# Patient Record
Sex: MALE | Race: WHITE | NOT HISPANIC OR LATINO | Employment: FULL TIME | URBAN - METROPOLITAN AREA
[De-identification: names, ages, dates, MRNs, and addresses within clinical notes are randomized per-mention and may not be internally consistent; named-entity substitution may affect disease eponyms.]

---

## 2020-10-13 PROCEDURE — U0003 INFECTIOUS AGENT DETECTION BY NUCLEIC ACID (DNA OR RNA); SEVERE ACUTE RESPIRATORY SYNDROME CORONAVIRUS 2 (SARS-COV-2) (CORONAVIRUS DISEASE [COVID-19]), AMPLIFIED PROBE TECHNIQUE, MAKING USE OF HIGH THROUGHPUT TECHNOLOGIES AS DESCRIBED BY CMS-2020-01-R: HCPCS | Performed by: EMERGENCY MEDICINE

## 2020-10-23 ENCOUNTER — OFFICE VISIT (OUTPATIENT)
Dept: FAMILY MEDICINE CLINIC | Facility: CLINIC | Age: 50
End: 2020-10-23

## 2020-10-23 ENCOUNTER — RESULTS ENCOUNTER (OUTPATIENT)
Dept: FAMILY MEDICINE CLINIC | Facility: CLINIC | Age: 50
End: 2020-10-23

## 2020-10-23 VITALS
SYSTOLIC BLOOD PRESSURE: 148 MMHG | TEMPERATURE: 97.1 F | WEIGHT: 265.8 LBS | DIASTOLIC BLOOD PRESSURE: 92 MMHG | RESPIRATION RATE: 16 BRPM | HEART RATE: 79 BPM | OXYGEN SATURATION: 98 % | HEIGHT: 71 IN | BODY MASS INDEX: 37.21 KG/M2

## 2020-10-23 DIAGNOSIS — E66.09 CLASS 2 OBESITY DUE TO EXCESS CALORIES WITHOUT SERIOUS COMORBIDITY WITH BODY MASS INDEX (BMI) OF 37.0 TO 37.9 IN ADULT: ICD-10-CM

## 2020-10-23 DIAGNOSIS — Z12.11 SCREENING FOR COLON CANCER: ICD-10-CM

## 2020-10-23 DIAGNOSIS — K21.9 GASTROESOPHAGEAL REFLUX DISEASE WITHOUT ESOPHAGITIS: ICD-10-CM

## 2020-10-23 DIAGNOSIS — I10 ESSENTIAL HYPERTENSION: Primary | ICD-10-CM

## 2020-10-23 DIAGNOSIS — J30.1 SEASONAL ALLERGIC RHINITIS DUE TO POLLEN: ICD-10-CM

## 2020-10-23 DIAGNOSIS — Z00.00 ANNUAL PHYSICAL EXAM: ICD-10-CM

## 2020-10-23 PROBLEM — E66.812 CLASS 2 OBESITY DUE TO EXCESS CALORIES WITHOUT SERIOUS COMORBIDITY WITH BODY MASS INDEX (BMI) OF 37.0 TO 37.9 IN ADULT: Status: ACTIVE | Noted: 2020-10-23

## 2020-10-23 PROCEDURE — 80053 COMPREHEN METABOLIC PANEL: CPT | Performed by: FAMILY MEDICINE

## 2020-10-23 PROCEDURE — 80061 LIPID PANEL: CPT | Performed by: FAMILY MEDICINE

## 2020-10-23 PROCEDURE — 93000 ELECTROCARDIOGRAM COMPLETE: CPT | Performed by: FAMILY MEDICINE

## 2020-10-23 PROCEDURE — 36415 COLL VENOUS BLD VENIPUNCTURE: CPT | Performed by: FAMILY MEDICINE

## 2020-10-23 PROCEDURE — 99386 PREV VISIT NEW AGE 40-64: CPT | Performed by: FAMILY MEDICINE

## 2020-10-23 RX ORDER — RIBOFLAVIN (VITAMIN B2) 100 MG
TABLET ORAL
COMMUNITY
Start: 2019-10-14

## 2020-10-23 RX ORDER — LISINOPRIL 20 MG/1
20 TABLET ORAL DAILY
Qty: 30 TABLET | Refills: 0 | Status: SHIPPED | OUTPATIENT
Start: 2020-10-23 | End: 2020-11-30 | Stop reason: SDUPTHER

## 2020-10-23 RX ORDER — FLUTICASONE PROPIONATE 50 MCG
2 SPRAY, SUSPENSION (ML) NASAL DAILY
Qty: 15.8 ML | Refills: 5 | Status: SHIPPED | OUTPATIENT
Start: 2020-10-23

## 2020-10-23 NOTE — PROGRESS NOTES
New Patient Office Visit      Patient Name: Saroj Ruggiero  : 1970   MRN: [id number]     Chief Complaint:    Chief Complaint   Patient presents with   • Establish Care   • Annual Exam   • Hypertension       History of Present Illness: Saroj Ruggiero is a 50 y.o. male who is here today to establish care.  Patient presents today with hypertension that is uncontrolled.  Patient was started on 10 mg at a urgent care center in reports has been taking 10 mg twice daily.  Patient denies any chest pain or shortness of breath.  Patient denies any side effects.  Patient has obesity is uncontrolled.  Patient has chronic GERD this controlled on Nexium.  Patient is also here for annual exam today.    Patient annual exam he says he is up-to-date on vaccines because he works at [employer].  Patient is okay with getting a Cologuard done today.  Otherwise patient is okay with doing some screening labs like lipids and CMP today.  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  Patient is okay with change in his diet as well.  Patient used to smoke cigarettes does not smoke any longer.    Subjective      Review of Systems:   Review of Systems   Constitutional: Negative for unexpected weight gain and unexpected weight loss.   HENT: Positive for congestion and postnasal drip. Negative for trouble swallowing.    Eyes: Negative for visual disturbance.   Respiratory: Negative for shortness of breath.    Cardiovascular: Negative for leg swelling.   Gastrointestinal: Negative for abdominal pain.   Endocrine: Negative for polyuria.   Genitourinary: Negative for difficulty urinating.   Musculoskeletal: Positive for joint swelling. Negative for arthralgias.   Skin: Negative for color change.   Allergic/Immunologic: Negative for immunocompromised state.   Neurological: Negative for weakness.   Hematological: Does not bruise/bleed easily.    Psychiatric/Behavioral: Negative for agitation.       Past Medical History: History reviewed. No pertinent past medical history.    Past Surgical History:   Past Surgical History:   Procedure Laterality Date   • CHOLECYSTECTOMY         Family History:   Family History   Problem Relation Age of Onset   • Diabetes Mother    • Hypertension Mother    • Heart attack Father    • Hypertension Father    • Anxiety disorder Father    • Heart valve disorder Sister    • No Known Problems Daughter    • No Known Problems Son    • Hypertension Maternal Grandmother    • Hypertension Maternal Grandfather    • Heart attack Maternal Grandfather    • No Known Problems Paternal Grandmother    • Heart attack Paternal Grandfather    • No Known Problems Daughter    • No Known Problems Daughter    • No Known Problems Daughter    • No Known Problems Daughter    • No Known Problems Daughter    • No Known Problems Daughter        Social History:   Social History     Socioeconomic History   • Marital status:      Spouse name: Not on file   • Number of children: Not on file   • Years of education: Not on file   • Highest education level: Not on file   Tobacco Use   • Smoking status: Former Smoker     Quit date: 2016     Years since quittin.8   • Smokeless tobacco: Never Used   Substance and Sexual Activity   • Alcohol use: Yes     Comment: socially   • Drug use: Defer   • Sexual activity: Defer       Medications:     Current Outpatient Medications:   •  ascorbic acid (VITAMIN C) 100 MG tablet, , Disp: , Rfl:   •  esomeprazole (nexIUM 24HR) 20 MG capsule, , Disp: , Rfl:   •  Multiple Vitamins-Minerals (MULTIVITAMIN ADULT PO), multivitamin, Disp: , Rfl:   •  fluticasone (Flonase) 50 MCG/ACT nasal spray, Instill 2 sprays into the nostril(s) as directed by provider Daily., Disp: 15.8 mL, Rfl: 5  •  lisinopril (PRINIVIL,ZESTRIL) 20 MG tablet, Take 1 tablet by mouth Daily., Disp: 30 tablet, Rfl: 0    Allergies:   No Known  "Allergies    Objective     Physical Exam:  Vital Signs:   Vitals:    10/23/20 1545   BP: 148/92   BP Location: Left arm   Patient Position: Sitting   Cuff Size: Adult   Pulse: 79   Resp: 16   Temp: 97.1 °F (36.2 °C)   TempSrc: Temporal   SpO2: 98%   Weight: 121 kg (265 lb 12.8 oz)   Height: 180.3 cm (71\")   PainSc: 0-No pain     Body mass index is 37.07 kg/m².     Physical Exam  Vitals signs and nursing note reviewed.   Constitutional:       General: He is not in acute distress.     Appearance: He is well-developed.   HENT:      Head: Normocephalic and atraumatic.      Right Ear: External ear normal.      Left Ear: External ear normal.   Eyes:      General:         Right eye: No discharge.         Left eye: No discharge.      Conjunctiva/sclera: Conjunctivae normal.      Pupils: Pupils are equal, round, and reactive to light.   Neck:      Musculoskeletal: Normal range of motion and neck supple.   Cardiovascular:      Rate and Rhythm: Normal rate and regular rhythm.      Heart sounds: Normal heart sounds. No murmur.   Pulmonary:      Effort: Pulmonary effort is normal. No respiratory distress.      Breath sounds: Normal breath sounds. No wheezing.   Abdominal:      General: Bowel sounds are normal. There is no distension.      Palpations: Abdomen is soft.   Musculoskeletal: Normal range of motion.         General: No deformity.   Skin:     General: Skin is warm and dry.   Neurological:      Mental Status: He is alert and oriented to person, place, and time.      Cranial Nerves: No cranial nerve deficit.   Psychiatric:         Behavior: Behavior normal.         Thought Content: Thought content normal.         Judgment: Judgment normal.         Assessment / Plan      Assessment/Plan:   Diagnoses and all orders for this visit:    1. Essential hypertension (Primary)  -     Comprehensive Metabolic Panel  -     Lipid Panel  -     lisinopril (PRINIVIL,ZESTRIL) 20 MG tablet; Take 1 tablet by mouth Daily.  Dispense: 30 " tablet; Refill: 0    2. Class 2 obesity due to excess calories without serious comorbidity with body mass index (BMI) of 37.0 to 37.9 in adult    3. Gastroesophageal reflux disease without esophagitis    4. Seasonal allergic rhinitis due to pollen  -     fluticasone (Flonase) 50 MCG/ACT nasal spray; Instill 2 sprays into the nostril(s) as directed by provider Daily.  Dispense: 15.8 mL; Refill: 5    5. Annual physical exam  -     Comprehensive Metabolic Panel  -     Lipid Panel    6. Screening for colon cancer  -     Cancel: Cologuard - Stool, Per Rectum  -     Cologuard - Stool, Per Rectum; Future    Other orders  -     ECG 12 Lead         1. Patient was counseled for his annual exam.  Counseling included recommendation of DASH diet which have included in his discharge papers today.  Also discussed importance of 30 minutes of physical activity daily.  Also discussed with patient the importance of yearly vaccines which she is up-to-date on.  Counseled patient on importance of colonoscopy which she is agreed to get the Cologuard today.  Patient was also counseled on importance of yearly visits and medication compliance.  Extensively counseled patient on importance of compliance of medication, diet control, weight loss, checking his blood pressure regularly.  EKG was performed today as he has hypertension and we need 1 on file.  EKG was essentially normal besides bradycardia.      ECG 12 Lead    Date/Time: 10/23/2020 4:39 PM  Performed by: Eric Milian DO  Authorized by: Eric Milian DO   Comparison: not compared with previous ECG   Previous ECG: no previous ECG available  Rhythm: sinus bradycardia  Rate: bradycardic  Conduction: conduction normal  QRS axis: normal  Other: no other findings    Clinical impression: abnormal EKG  Comments: Patient had a EKG that showed sinus bradycardia and QTC was normal at 394.  Patient asymptomatic.  Abnormal EKG              Follow Up:   Return in about 4 weeks (around  11/20/2020) for bp check.    Eric Milian,   [facility] [place]       Please note that portions of this note may have been completed with a voice recognition program. Efforts were made to edit the dictations, but occasionally words are mistranscribed.

## 2020-10-23 NOTE — PATIENT INSTRUCTIONS
"DASH Eating Plan  DASH stands for \"Dietary Approaches to Stop Hypertension.\" The DASH eating plan is a healthy eating plan that has been shown to reduce high blood pressure (hypertension). It may also reduce your risk for type 2 diabetes, heart disease, and stroke. The DASH eating plan may also help with weight loss.  What are tips for following this plan?    General guidelines  · Avoid eating more than 2,300 mg (milligrams) of salt (sodium) a day. If you have hypertension, you may need to reduce your sodium intake to 1,500 mg a day.  · Limit alcohol intake to no more than 1 drink a day for nonpregnant women and 2 drinks a day for men. One drink equals 12 oz of beer, 5 oz of wine, or 1½ oz of hard liquor.  · Work with your health care provider to maintain a healthy body weight or to lose weight. Ask what an ideal weight is for you.  · Get at least 30 minutes of exercise that causes your heart to beat faster (aerobic exercise) most days of the week. Activities may include walking, swimming, or biking.  · Work with your health care provider or diet and nutrition specialist (dietitian) to adjust your eating plan to your individual calorie needs.  Reading food labels    · Check food labels for the amount of sodium per serving. Choose foods with less than 5 percent of the Daily Value of sodium. Generally, foods with less than 300 mg of sodium per serving fit into this eating plan.  · To find whole grains, look for the word \"whole\" as the first word in the ingredient list.  Shopping  · Buy products labeled as \"low-sodium\" or \"no salt added.\"  · Buy fresh foods. Avoid canned foods and premade or frozen meals.  Cooking  · Avoid adding salt when cooking. Use salt-free seasonings or herbs instead of table salt or sea salt. Check with your health care provider or pharmacist before using salt substitutes.  · Do not cuevas foods. Cook foods using healthy methods such as baking, boiling, grilling, and broiling instead.  · Cook with " heart-healthy oils, such as olive, canola, soybean, or sunflower oil.  Meal planning  · Eat a balanced diet that includes:  ? 5 or more servings of fruits and vegetables each day. At each meal, try to fill half of your plate with fruits and vegetables.  ? Up to 6-8 servings of whole grains each day.  ? Less than 6 oz of lean meat, poultry, or fish each day. A 3-oz serving of meat is about the same size as a deck of cards. One egg equals 1 oz.  ? 2 servings of low-fat dairy each day.  ? A serving of nuts, seeds, or beans 5 times each week.  ? Heart-healthy fats. Healthy fats called Omega-3 fatty acids are found in foods such as flaxseeds and coldwater fish, like sardines, salmon, and mackerel.  · Limit how much you eat of the following:  ? Canned or prepackaged foods.  ? Food that is high in trans fat, such as fried foods.  ? Food that is high in saturated fat, such as fatty meat.  ? Sweets, desserts, sugary drinks, and other foods with added sugar.  ? Full-fat dairy products.  · Do not salt foods before eating.  · Try to eat at least 2 vegetarian meals each week.  · Eat more home-cooked food and less restaurant, buffet, and fast food.  · When eating at a restaurant, ask that your food be prepared with less salt or no salt, if possible.  What foods are recommended?  The items listed may not be a complete list. Talk with your dietitian about what dietary choices are best for you.  Grains  Whole-grain or whole-wheat bread. Whole-grain or whole-wheat pasta. Brown rice. Oatmeal. Quinoa. Bulgur. Whole-grain and low-sodium cereals. Andria bread. Low-fat, low-sodium crackers. Whole-wheat flour tortillas.  Vegetables  Fresh or frozen vegetables (raw, steamed, roasted, or grilled). Low-sodium or reduced-sodium tomato and vegetable juice. Low-sodium or reduced-sodium tomato sauce and tomato paste. Low-sodium or reduced-sodium canned vegetables.  Fruits  All fresh, dried, or frozen fruit. Canned fruit in natural juice (without  added sugar).  Meat and other protein foods  Skinless chicken or turkey. Ground chicken or turkey. Pork with fat trimmed off. Fish and seafood. Egg whites. Dried beans, peas, or lentils. Unsalted nuts, nut butters, and seeds. Unsalted canned beans. Lean cuts of beef with fat trimmed off. Low-sodium, lean deli meat.  Dairy  Low-fat (1%) or fat-free (skim) milk. Fat-free, low-fat, or reduced-fat cheeses. Nonfat, low-sodium ricotta or cottage cheese. Low-fat or nonfat yogurt. Low-fat, low-sodium cheese.  Fats and oils  Soft margarine without trans fats. Vegetable oil. Low-fat, reduced-fat, or light mayonnaise and salad dressings (reduced-sodium). Canola, safflower, olive, soybean, and sunflower oils. Avocado.  Seasoning and other foods  Herbs. Spices. Seasoning mixes without salt. Unsalted popcorn and pretzels. Fat-free sweets.  What foods are not recommended?  The items listed may not be a complete list. Talk with your dietitian about what dietary choices are best for you.  Grains  Baked goods made with fat, such as croissants, muffins, or some breads. Dry pasta or rice meal packs.  Vegetables  Creamed or fried vegetables. Vegetables in a cheese sauce. Regular canned vegetables (not low-sodium or reduced-sodium). Regular canned tomato sauce and paste (not low-sodium or reduced-sodium). Regular tomato and vegetable juice (not low-sodium or reduced-sodium). Pickles. Olives.  Fruits  Canned fruit in a light or heavy syrup. Fried fruit. Fruit in cream or butter sauce.  Meat and other protein foods  Fatty cuts of meat. Ribs. Fried meat. Ferrell. Sausage. Bologna and other processed lunch meats. Salami. Fatback. Hotdogs. Bratwurst. Salted nuts and seeds. Canned beans with added salt. Canned or smoked fish. Whole eggs or egg yolks. Chicken or turkey with skin.  Dairy  Whole or 2% milk, cream, and half-and-half. Whole or full-fat cream cheese. Whole-fat or sweetened yogurt. Full-fat cheese. Nondairy creamers. Whipped toppings.  Processed cheese and cheese spreads.  Fats and oils  Butter. Stick margarine. Lard. Shortening. Ghee. Ferrell fat. Tropical oils, such as coconut, palm kernel, or palm oil.  Seasoning and other foods  Salted popcorn and pretzels. Onion salt, garlic salt, seasoned salt, table salt, and sea salt. Worcestershire sauce. Tartar sauce. Barbecue sauce. Teriyaki sauce. Soy sauce, including reduced-sodium. Steak sauce. Canned and packaged gravies. Fish sauce. Oyster sauce. Cocktail sauce. Horseradish that you find on the shelf. Ketchup. Mustard. Meat flavorings and tenderizers. Bouillon cubes. Hot sauce and Tabasco sauce. Premade or packaged marinades. Premade or packaged taco seasonings. Relishes. Regular salad dressings.  Where to find more information:  · National Heart, Lung, and Blood [place]: www.nhlbi.nih.gov  · American Heart Association: www.heart.org  Summary  · The DASH eating plan is a healthy eating plan that has been shown to reduce high blood pressure (hypertension). It may also reduce your risk for type 2 diabetes, heart disease, and stroke.  · With the DASH eating plan, you should limit salt (sodium) intake to 2,300 mg a day. If you have hypertension, you may need to reduce your sodium intake to 1,500 mg a day.  · When on the DASH eating plan, aim to eat more fresh fruits and vegetables, whole grains, lean proteins, low-fat dairy, and heart-healthy fats.  · Work with your health care provider or diet and nutrition specialist (dietitian) to adjust your eating plan to your individual calorie needs.  This information is not intended to replace advice given to you by your health care provider. Make sure you discuss any questions you have with your health care provider.  Document Released: 12/06/2012 Document Revised: 11/30/2018 Document Reviewed: 12/11/2017  Elsevier Patient Education © 2020 [employer] Inc.

## 2020-10-24 LAB
ALBUMIN SERPL-MCNC: 4.8 G/DL (ref 3.5–5.2)
ALBUMIN/GLOB SERPL: 2.1 G/DL
ALP SERPL-CCNC: 144 U/L (ref 39–117)
ALT SERPL W P-5'-P-CCNC: 47 U/L (ref 1–41)
ANION GAP SERPL CALCULATED.3IONS-SCNC: 11.2 MMOL/L (ref 5–15)
AST SERPL-CCNC: 34 U/L (ref 1–40)
BILIRUB SERPL-MCNC: 0.3 MG/DL (ref 0–1.2)
BUN SERPL-MCNC: 13 MG/DL (ref 6–20)
BUN/CREAT SERPL: 13.8 (ref 7–25)
CALCIUM SPEC-SCNC: 9.9 MG/DL (ref 8.6–10.5)
CHLORIDE SERPL-SCNC: 101 MMOL/L (ref 98–107)
CHOLEST SERPL-MCNC: 183 MG/DL (ref 0–200)
CO2 SERPL-SCNC: 29.8 MMOL/L (ref 22–29)
CREAT SERPL-MCNC: 0.94 MG/DL (ref 0.76–1.27)
GFR SERPL CREATININE-BSD FRML MDRD: 84 ML/MIN/1.73
GLOBULIN UR ELPH-MCNC: 2.3 GM/DL
GLUCOSE SERPL-MCNC: 92 MG/DL (ref 65–99)
HDLC SERPL-MCNC: 27 MG/DL (ref 40–60)
LDLC SERPL CALC-MCNC: 103 MG/DL (ref 0–100)
LDLC/HDLC SERPL: 3.47 {RATIO}
POTASSIUM SERPL-SCNC: 4.5 MMOL/L (ref 3.5–5.2)
PROT SERPL-MCNC: 7.1 G/DL (ref 6–8.5)
SODIUM SERPL-SCNC: 142 MMOL/L (ref 136–145)
TRIGL SERPL-MCNC: 311 MG/DL (ref 0–150)
VLDLC SERPL-MCNC: 53 MG/DL (ref 5–40)

## 2020-11-13 ENCOUNTER — TELEPHONE (OUTPATIENT)
Dept: FAMILY MEDICINE CLINIC | Facility: CLINIC | Age: 50
End: 2020-11-13

## 2020-11-13 DIAGNOSIS — R19.5 POSITIVE COLORECTAL CANCER SCREENING USING COLOGUARD TEST: Primary | ICD-10-CM

## 2020-11-13 NOTE — TELEPHONE ENCOUNTER
ALONA WANTED TO LET DR ROTHMAN KNOW THAT THEY HAVE SENT THE RESULTS OF THE PT'S COLOGUARD.     ALONA'S CONTACT [phone]

## 2020-11-25 DIAGNOSIS — Z12.11 SCREENING FOR COLON CANCER: ICD-10-CM

## 2020-11-25 DIAGNOSIS — Z12.11 SCREENING FOR COLON CANCER: Primary | ICD-10-CM

## 2020-11-25 RX ORDER — SODIUM, POTASSIUM,MAG SULFATES 17.5-3.13G
1 SOLUTION, RECONSTITUTED, ORAL ORAL TAKE AS DIRECTED
Qty: 2 BOTTLE | Refills: 0 | Status: SHIPPED | OUTPATIENT
Start: 2020-11-25 | End: 2020-11-25 | Stop reason: SDUPTHER

## 2020-11-30 ENCOUNTER — APPOINTMENT (OUTPATIENT)
Dept: PREADMISSION TESTING | Facility: HOSPITAL | Age: 50
End: 2020-11-30

## 2020-11-30 DIAGNOSIS — I10 ESSENTIAL HYPERTENSION: ICD-10-CM

## 2020-11-30 LAB — SARS-COV-2 RNA RESP QL NAA+PROBE: NOT DETECTED

## 2020-11-30 PROCEDURE — C9803 HOPD COVID-19 SPEC COLLECT: HCPCS

## 2020-11-30 PROCEDURE — U0004 COV-19 TEST NON-CDC HGH THRU: HCPCS

## 2020-11-30 RX ORDER — LISINOPRIL 20 MG/1
20 TABLET ORAL DAILY
Qty: 30 TABLET | Refills: 0 | Status: SHIPPED | OUTPATIENT
Start: 2020-11-30 | End: 2020-12-14 | Stop reason: SDUPTHER

## 2020-11-30 RX ORDER — SODIUM, POTASSIUM,MAG SULFATES 17.5-3.13G
1 SOLUTION, RECONSTITUTED, ORAL ORAL TAKE AS DIRECTED
Qty: 354 ML | Refills: 0 | Status: SHIPPED | OUTPATIENT
Start: 2020-11-30 | End: 2020-12-14

## 2020-11-30 NOTE — TELEPHONE ENCOUNTER
Caller: Saroj Ruggiero    Relationship: Self    Best call back number: [phone]     Medication needed:   Requested Prescriptions     Pending Prescriptions Disp Refills   • lisinopril (PRINIVIL,ZESTRIL) 20 MG tablet 30 tablet 0     Sig: Take 1 tablet by mouth Daily.       When do you need the refill by: ASAP    What details did the patient provide when requesting the medication: PATIENT IS REQUESTING A REFILL ON ABOVE MEDICATION. PATIENT APPOINTMENT DATE WAS CHANGED BY THE OFFICE AND PATIENT IS NEEDING ENOUGH MEDICATION TO GET HIM THROUGH TILL HIS APPOINTMENT THAT IS SCHEDULED FOR 12/14/20    Does the patient have less than a 3 day supply:  [x] Yes  [] No    What is the patient's preferred pharmacy: [facility]

## 2020-12-02 ENCOUNTER — OUTSIDE FACILITY SERVICE (OUTPATIENT)
Dept: GASTROENTEROLOGY | Facility: CLINIC | Age: 50
End: 2020-12-02

## 2020-12-02 PROCEDURE — 45385 COLONOSCOPY W/LESION REMOVAL: CPT | Performed by: INTERNAL MEDICINE

## 2020-12-02 PROCEDURE — 88305 TISSUE EXAM BY PATHOLOGIST: CPT | Performed by: INTERNAL MEDICINE

## 2020-12-03 ENCOUNTER — LAB REQUISITION (OUTPATIENT)
Dept: LAB | Facility: HOSPITAL | Age: 50
End: 2020-12-03

## 2020-12-03 DIAGNOSIS — Z12.11 ENCOUNTER FOR SCREENING FOR MALIGNANT NEOPLASM OF COLON: ICD-10-CM

## 2020-12-04 ENCOUNTER — TELEPHONE (OUTPATIENT)
Dept: GASTROENTEROLOGY | Facility: CLINIC | Age: 50
End: 2020-12-04

## 2020-12-04 LAB
CYTO UR: NORMAL
LAB AP CASE REPORT: NORMAL
LAB AP CLINICAL INFORMATION: NORMAL
PATH REPORT.FINAL DX SPEC: NORMAL
PATH REPORT.GROSS SPEC: NORMAL

## 2020-12-04 NOTE — TELEPHONE ENCOUNTER
I called and spoke with Saroj regarding pathology.  He had 8 polyps that were removed and there were several that were serrated adenomas from the right colon.  My recommendation would be to repeat the examination in 1 year.

## 2020-12-14 ENCOUNTER — OFFICE VISIT (OUTPATIENT)
Dept: FAMILY MEDICINE CLINIC | Facility: CLINIC | Age: 50
End: 2020-12-14

## 2020-12-14 VITALS
HEIGHT: 71 IN | SYSTOLIC BLOOD PRESSURE: 132 MMHG | HEART RATE: 79 BPM | RESPIRATION RATE: 16 BRPM | OXYGEN SATURATION: 99 % | BODY MASS INDEX: 37.52 KG/M2 | TEMPERATURE: 97.8 F | DIASTOLIC BLOOD PRESSURE: 84 MMHG | WEIGHT: 268 LBS

## 2020-12-14 DIAGNOSIS — I10 ESSENTIAL HYPERTENSION: Primary | ICD-10-CM

## 2020-12-14 DIAGNOSIS — E66.09 CLASS 2 OBESITY DUE TO EXCESS CALORIES WITHOUT SERIOUS COMORBIDITY WITH BODY MASS INDEX (BMI) OF 37.0 TO 37.9 IN ADULT: ICD-10-CM

## 2020-12-14 PROCEDURE — 80048 BASIC METABOLIC PNL TOTAL CA: CPT | Performed by: FAMILY MEDICINE

## 2020-12-14 PROCEDURE — 99214 OFFICE O/P EST MOD 30 MIN: CPT | Performed by: FAMILY MEDICINE

## 2020-12-14 PROCEDURE — 36415 COLL VENOUS BLD VENIPUNCTURE: CPT | Performed by: FAMILY MEDICINE

## 2020-12-14 RX ORDER — LISINOPRIL 20 MG/1
20 TABLET ORAL DAILY
Qty: 30 TABLET | Refills: 11 | Status: SHIPPED | OUTPATIENT
Start: 2020-12-14 | End: 2021-10-28 | Stop reason: SDUPTHER

## 2020-12-14 NOTE — PROGRESS NOTES
Follow Up Office Visit      Patient Name: Saroj Ruggiero  : 1970   MRN: [id number]     Chief Complaint:    Chief Complaint   Patient presents with   • Hypertension       History of Present Illness: Saroj Ruggiero is a 50 y.o. male who is here today to follow up with hypertension and recent colonosopy. Patient was seen here in October for elevated blood pressure. Patient was started on Lisinopril 20mg. Patient says his BP is controlled with this and runs around 130s/80s at home. He says there have been a few times when it ran in the 140s. His BP today is 132/84 in the clinic. Patient is compliant with his medications and reports no side effects from them. Patient chest pain, SOB, chest tightness, numbness, tingling, leg swelling, palpitations.    Obesity-patient also has uncontrolled obesity.  Patient has been trying to work on his diet but he has not made any changes to it.  Patient did review the DASH diet I gave him last time.  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  Patient reports that he is gained 3 pounds since last visit.  Patient has not gone to nutritionist yet.    Due to the patient's positive cologuard, he wants sent for a colonoscopy. They found several polyps during the procedure on 2020. The GI doctor removed a few of them and would like to do a repeat colonoscopy in 2021.       Subjective      Review of Systems:   Review of Systems   Constitutional: Negative for chills and fever.   HENT: Negative for sore throat.    Eyes: Negative for blurred vision.   Respiratory: Negative for cough, chest tightness and shortness of breath.    Cardiovascular: Negative for chest pain, palpitations and leg swelling.   Gastrointestinal: Negative for abdominal pain, constipation and diarrhea.   Genitourinary: Negative for dysuria.   Musculoskeletal: Positive for arthralgias. Negative for back pain, myalgias and neck pain.   Neurological: Negative for  "light-headedness, numbness and headache.   Psychiatric/Behavioral: Negative for depressed mood.       I have reviewed and the following portions of the patient's history were updated as appropriate: past family history, past medical history, past social history, past surgical history and problem list.    Medications:     Current Outpatient Medications:   •  ascorbic acid (VITAMIN C) 100 MG tablet, , Disp: , Rfl:   •  esomeprazole (nexIUM 24HR) 20 MG capsule, , Disp: , Rfl:   •  fluticasone (Flonase) 50 MCG/ACT nasal spray, Instill 2 sprays into the nostril(s) as directed by provider Daily., Disp: 15.8 mL, Rfl: 5  •  lisinopril (PRINIVIL,ZESTRIL) 20 MG tablet, Take 1 tablet by mouth Daily., Disp: 30 tablet, Rfl: 11  •  Multiple Vitamins-Minerals (MULTIVITAMIN ADULT PO), multivitamin, Disp: , Rfl:     Allergies:   No Known Allergies    Objective     Physical Exam:  Vital Signs:   Vitals:    12/14/20 1635   BP: 132/84   BP Location: Right arm   Patient Position: Sitting   Cuff Size: Adult   Pulse: 79   Resp: 16   Temp: 97.8 °F (36.6 °C)   TempSrc: Temporal   SpO2: 99%   Weight: 122 kg (268 lb)   Height: 180.3 cm (71\")   PainSc: 0-No pain     Body mass index is 37.38 kg/m².    Physical Exam  Constitutional:       Appearance: Normal appearance.   HENT:      Head: Normocephalic and atraumatic.      Right Ear: External ear normal.      Left Ear: External ear normal.   Eyes:      Pupils: Pupils are equal, round, and reactive to light.   Cardiovascular:      Rate and Rhythm: Normal rate and regular rhythm.      Pulses: Normal pulses.      Heart sounds: Normal heart sounds. No murmur. No gallop.    Pulmonary:      Effort: Pulmonary effort is normal. No respiratory distress.      Breath sounds: Normal breath sounds. No wheezing or rales.   Abdominal:      General: Abdomen is flat. Bowel sounds are normal. There is no distension.      Palpations: Abdomen is soft.      Tenderness: There is no abdominal tenderness. There is no " guarding.   Musculoskeletal: Normal range of motion.   Skin:     General: Skin is warm.   Neurological:      General: No focal deficit present.      Mental Status: He is alert.   Psychiatric:         Mood and Affect: Mood normal.         Behavior: Behavior normal.           Assessment / Plan      Assessment/Plan:   Diagnoses and all orders for this visit:    1. Essential hypertension (Primary)  -     Basic Metabolic Panel        -   Refill lisinopril 20 mg for 12 months.       -Recommend DASH diet and losing 5 to 10 pounds.  Also recommend patient keep ambulatory readings of his blood pressure daily.  Follow-up in 3 months    2. Class 2 obesity due to excess calories without serious comorbidity with body mass index (BMI) of 37.0 to 37.9 in adult  I discussed with patient DASH diet and losing weight.  I recommend that he lose 1 pound per week.  Also recommend patient begin to exercise 30 minutes daily.  Patient is willing to make some dietary changes and will review the DASH diet again medication.  Patient will follow up in 3 months with the goal of losing 1 pound per week.    At follow-up if patient has not lost recommendation of 10 to 12 pounds of weight and we will consider sending him to nutrition for further help.  Patient deferred nutrition consult today.    Follow Up:   Return in about 3 months (around 3/14/2021) for weight check.    Eric Milian DO  [facility] [place]       Please note that portions of this note may have been completed with a voice recognition program. Efforts were made to edit the dictations, but occasionally words are mistranscribed. Answers for HPI/ROS submitted by the patient on 12/7/2020   Hypertension  What is the primary reason for your visit?: High Blood Pressure  Chronicity: recurrent  Onset: more than 1 month ago  Progression since onset: gradually improving  Condition status: controlled  anxiety: Yes  headaches: No  malaise/fatigue: No  orthopnea: No  peripheral edema:  No  PND: No  sweats: No  Agents associated with hypertension: no associated agents  CAD risks: diabetes mellitus, dyslipidemia, family history, obesity, stress  Compliance problems: diet

## 2020-12-15 LAB
ANION GAP SERPL CALCULATED.3IONS-SCNC: 9.3 MMOL/L (ref 5–15)
BUN SERPL-MCNC: 9 MG/DL (ref 6–20)
BUN/CREAT SERPL: 9.1 (ref 7–25)
CALCIUM SPEC-SCNC: 9.6 MG/DL (ref 8.6–10.5)
CHLORIDE SERPL-SCNC: 101 MMOL/L (ref 98–107)
CO2 SERPL-SCNC: 29.7 MMOL/L (ref 22–29)
CREAT SERPL-MCNC: 0.99 MG/DL (ref 0.76–1.27)
GFR SERPL CREATININE-BSD FRML MDRD: 79 ML/MIN/1.73
GLUCOSE SERPL-MCNC: 74 MG/DL (ref 65–99)
POTASSIUM SERPL-SCNC: 4.6 MMOL/L (ref 3.5–5.2)
SODIUM SERPL-SCNC: 140 MMOL/L (ref 136–145)

## 2021-01-28 ENCOUNTER — IMMUNIZATION (OUTPATIENT)
Dept: VACCINE CLINIC | Facility: HOSPITAL | Age: 51
End: 2021-01-28

## 2021-01-28 PROCEDURE — 91300 HC SARSCOV02 VAC 30MCG/0.3ML IM: CPT | Performed by: INTERNAL MEDICINE

## 2021-01-28 PROCEDURE — 0001A: CPT | Performed by: INTERNAL MEDICINE

## 2021-02-18 ENCOUNTER — IMMUNIZATION (OUTPATIENT)
Dept: VACCINE CLINIC | Facility: HOSPITAL | Age: 51
End: 2021-02-18

## 2021-02-18 PROCEDURE — 0002A: CPT | Performed by: INTERNAL MEDICINE

## 2021-02-18 PROCEDURE — 91300 HC SARSCOV02 VAC 30MCG/0.3ML IM: CPT | Performed by: INTERNAL MEDICINE

## 2021-03-15 ENCOUNTER — OFFICE VISIT (OUTPATIENT)
Dept: FAMILY MEDICINE CLINIC | Facility: CLINIC | Age: 51
End: 2021-03-15

## 2021-03-15 VITALS
RESPIRATION RATE: 16 BRPM | HEIGHT: 71 IN | TEMPERATURE: 98.6 F | SYSTOLIC BLOOD PRESSURE: 142 MMHG | BODY MASS INDEX: 37.97 KG/M2 | WEIGHT: 271.2 LBS | DIASTOLIC BLOOD PRESSURE: 80 MMHG | HEART RATE: 60 BPM | OXYGEN SATURATION: 97 %

## 2021-03-15 DIAGNOSIS — I10 WHITE COAT SYNDROME WITH DIAGNOSIS OF HYPERTENSION: ICD-10-CM

## 2021-03-15 DIAGNOSIS — E66.09 CLASS 2 OBESITY DUE TO EXCESS CALORIES WITHOUT SERIOUS COMORBIDITY WITH BODY MASS INDEX (BMI) OF 37.0 TO 37.9 IN ADULT: ICD-10-CM

## 2021-03-15 DIAGNOSIS — I10 ESSENTIAL HYPERTENSION: Primary | ICD-10-CM

## 2021-03-15 PROCEDURE — 99213 OFFICE O/P EST LOW 20 MIN: CPT | Performed by: FAMILY MEDICINE

## 2021-03-15 NOTE — PATIENT INSTRUCTIONS

## 2021-03-15 NOTE — PROGRESS NOTES
"     Follow Up Office Visit      Patient Name: Saroj Ruggiero  : 1970   MRN: [id number]     Chief Complaint:    Chief Complaint   Patient presents with   • Hypertension     follow up       History of Present Illness: Saroj Ruggiero is a 50 y.o. male who is here today to follow up with hypertension that is controlled at home.  Patient says he runs in the 130s as far systolic range below 90 for diastolic range.  Patient denies any side effects from his blood pressure.  Patient reports continued obesity and is try to lose weight but has not been physically active due to the weather.  Patient wants to start becoming more physically active will try calorie counting as we discussed today.    Review of systems is negative for chest pain shortness of breath    Physical exam showed normal heart and lung exam.  Patient's mood and affect were appropriate.      Subjective        I have reviewed and the following portions of the patient's history were updated as appropriate: past family history, past medical history, past social history, past surgical history and problem list.    Medications:     Current Outpatient Medications:   •  ascorbic acid (VITAMIN C) 100 MG tablet, , Disp: , Rfl:   •  esomeprazole (nexIUM 24HR) 20 MG capsule, , Disp: , Rfl:   •  fluticasone (Flonase) 50 MCG/ACT nasal spray, Instill 2 sprays into the nostril(s) as directed by provider Daily., Disp: 15.8 mL, Rfl: 5  •  lisinopril (PRINIVIL,ZESTRIL) 20 MG tablet, Take 1 tablet by mouth Daily., Disp: 30 tablet, Rfl: 11  •  Multiple Vitamins-Minerals (MULTIVITAMIN ADULT PO), multivitamin, Disp: , Rfl:     Allergies:   No Known Allergies    Objective     Physical Exam: Please see HPI for physical exam  Vital Signs:   Vitals:    03/15/21 1600   BP: 142/80   Pulse: 60   Resp: 16   Temp: 98.6 °F (37 °C)   TempSrc: Temporal   SpO2: 97%   Weight: 123 kg (271 lb 3.2 oz)   Height: 180.3 cm (71\")   PainSc: 0-No pain     Body mass index is 37.82 " kg/m².          Assessment / Plan      Assessment/Plan:   Diagnoses and all orders for this visit:    1. Essential hypertension (Primary)    2. Class 2 obesity due to excess calories without serious comorbidity with body mass index (BMI) of 37.0 to 37.9 in adult    Continue lisinopril.  Patient seems to have a component of whitecoat syndrome.  We will monitor this at further visits but given his controlled blood pressure at home we will continue current therapy.  Patient's obesity is unchanged and we discussed calorie counting today.  I gave him information sheets regarding this.  Follow Up:   Return in about 6 months (around 9/15/2021) for or 3 months for weight check.    Eric Milian, DO  [facility] [place] [facility] [place]       Please note that portions of this note may have been completed with a voice recognition program. Efforts were made to edit the dictations, but occasionally words are mistranscribed.

## 2021-10-28 ENCOUNTER — OFFICE VISIT (OUTPATIENT)
Dept: FAMILY MEDICINE CLINIC | Facility: CLINIC | Age: 51
End: 2021-10-28

## 2021-10-28 VITALS
BODY MASS INDEX: 37.24 KG/M2 | SYSTOLIC BLOOD PRESSURE: 128 MMHG | HEIGHT: 71 IN | TEMPERATURE: 97.3 F | WEIGHT: 266 LBS | DIASTOLIC BLOOD PRESSURE: 86 MMHG | OXYGEN SATURATION: 100 % | RESPIRATION RATE: 12 BRPM | HEART RATE: 67 BPM

## 2021-10-28 DIAGNOSIS — R74.8 ELEVATED LIVER ENZYMES: Primary | ICD-10-CM

## 2021-10-28 DIAGNOSIS — I10 ESSENTIAL HYPERTENSION: ICD-10-CM

## 2021-10-28 LAB
ALBUMIN SERPL-MCNC: 4.7 G/DL (ref 3.5–5.2)
ALBUMIN/GLOB SERPL: 1.8 G/DL
ALP SERPL-CCNC: 133 U/L (ref 39–117)
ALT SERPL W P-5'-P-CCNC: 39 U/L (ref 1–41)
ANION GAP SERPL CALCULATED.3IONS-SCNC: 10.2 MMOL/L (ref 5–15)
AST SERPL-CCNC: 30 U/L (ref 1–40)
BILIRUB SERPL-MCNC: 0.2 MG/DL (ref 0–1.2)
BUN SERPL-MCNC: 14 MG/DL (ref 6–20)
BUN/CREAT SERPL: 14.1 (ref 7–25)
CALCIUM SPEC-SCNC: 9.7 MG/DL (ref 8.6–10.5)
CHLORIDE SERPL-SCNC: 102 MMOL/L (ref 98–107)
CO2 SERPL-SCNC: 28.8 MMOL/L (ref 22–29)
CREAT SERPL-MCNC: 0.99 MG/DL (ref 0.76–1.27)
GFR SERPL CREATININE-BSD FRML MDRD: 79 ML/MIN/1.73
GLOBULIN UR ELPH-MCNC: 2.6 GM/DL
GLUCOSE SERPL-MCNC: 147 MG/DL (ref 65–99)
POTASSIUM SERPL-SCNC: 4.2 MMOL/L (ref 3.5–5.2)
PROT SERPL-MCNC: 7.3 G/DL (ref 6–8.5)
SODIUM SERPL-SCNC: 141 MMOL/L (ref 136–145)

## 2021-10-28 PROCEDURE — 80053 COMPREHEN METABOLIC PANEL: CPT | Performed by: FAMILY MEDICINE

## 2021-10-28 PROCEDURE — 99213 OFFICE O/P EST LOW 20 MIN: CPT | Performed by: FAMILY MEDICINE

## 2021-10-28 RX ORDER — ZINC GLUCONATE 50 MG
TABLET ORAL
COMMUNITY
Start: 2021-08-01

## 2021-10-28 RX ORDER — ACETAMINOPHEN 160 MG
TABLET,DISINTEGRATING ORAL
COMMUNITY
Start: 2021-08-01

## 2021-10-28 RX ORDER — LISINOPRIL 20 MG/1
20 TABLET ORAL DAILY
Qty: 90 TABLET | Refills: 3 | Status: SHIPPED | OUTPATIENT
Start: 2021-10-28 | End: 2022-10-31 | Stop reason: SDUPTHER

## 2021-10-28 NOTE — PROGRESS NOTES
Follow Up Office Visit      Patient Name: Saroj Ruggiero  : 1970   MRN: [id number]     Chief Complaint:    Chief Complaint   Patient presents with   • 6 mnt f/u     b/p       History of Present Illness: Saroj Ruggiero is a 51 y.o. male who is here today to follow up with hypertension and elevated liver enzymes.  Patient had elevated LFT in his remote history.  I saw this on review of labs today.  Patient reports history of drinking alcohol.  Patient has stopped over 4 to 5 years ago.  We discussed rechecking his liver enzymes to see their status today.    Patient also has hypertension that is well controlled.  Patient is on lisinopril and denies side effects.  He denies chest pain shortness of breath.      Review of systems was negative for chest pain      Physical exam: Patient's heart and lung exam was normal.  No murmurs noted.  Patient had regular rate.  Patient's lung exam showed clear to auscultation.      Subjective        I have reviewed and the following portions of the patient's history were updated as appropriate: past family history, past medical history, past social history, past surgical history and problem list.    Medications:     Current Outpatient Medications:   •  ascorbic acid (VITAMIN C) 100 MG tablet, , Disp: , Rfl:   •  Cholecalciferol (Vitamin D3) 50 MCG ( UT) capsule, , Disp: , Rfl:   •  esomeprazole (nexIUM 24HR) 20 MG capsule, , Disp: , Rfl:   •  fluticasone (Flonase) 50 MCG/ACT nasal spray, Instill 2 sprays into the nostril(s) as directed by provider Daily., Disp: 15.8 mL, Rfl: 5  •  lisinopril (PRINIVIL,ZESTRIL) 20 MG tablet, Take 1 tablet by mouth Daily., Disp: 90 tablet, Rfl: 3  •  Multiple Vitamins-Minerals (MULTIVITAMIN ADULT PO), multivitamin, Disp: , Rfl:   •  Probiotic Product (PROBIOTIC-10 PO), , Disp: , Rfl:   •  Zinc 50 MG tablet, , Disp: , Rfl:     Allergies:   No Known Allergies    Objective     Physical Exam: See above  Vital Signs:   Vitals:    10/28/21  "0845   BP: 128/86   Pulse: 67   Resp: 12   Temp: 97.3 °F (36.3 °C)   SpO2: 100%   Weight: 121 kg (266 lb)   Height: 180.3 cm (71\")   PainSc: 0-No pain     Body mass index is 37.1 kg/m².          Assessment / Plan      Assessment/Plan:   Diagnoses and all orders for this visit:    1. Elevated liver enzymes (Primary)  -     Comprehensive Metabolic Panel    2. Essential hypertension  -     lisinopril (PRINIVIL,ZESTRIL) 20 MG tablet; Take 1 tablet by mouth Daily.  Dispense: 90 tablet; Refill: 3  -     Comprehensive Metabolic Panel    Patient to follow-up in 6 months for follow-up.  Consider annual exam    Follow Up:   Return in about 6 months (around 4/28/2022).    Eric Milian DO  [facility] [place] [place]   Answers for HPI/ROS submitted by the patient on 10/26/2021  What is the primary reason for your visit?: High Blood Pressure  Chronicity: recurrent  Onset: more than 1 year ago  Progression since onset: gradually improving  Condition status: controlled  anxiety: No  blurred vision: No  chest pain: No  headaches: No  malaise/fatigue: No  neck pain: No  orthopnea: No  palpitations: No  peripheral edema: No  PND: No  shortness of breath: No  sweats: No  CAD risks: dyslipidemia, family history, obesity  Compliance problems: diet      "

## 2022-10-31 DIAGNOSIS — I10 ESSENTIAL HYPERTENSION: ICD-10-CM

## 2022-10-31 RX ORDER — LISINOPRIL 20 MG/1
20 TABLET ORAL DAILY
Qty: 90 TABLET | Refills: 3 | Status: SHIPPED | OUTPATIENT
Start: 2022-10-31

## 2022-10-31 NOTE — TELEPHONE ENCOUNTER
Rx Refill Note  Requested Prescriptions     Pending Prescriptions Disp Refills   • lisinopril (PRINIVIL,ZESTRIL) 20 MG tablet 90 tablet 3     Sig: Take 1 tablet by mouth Daily.      Last office visit with prescribing clinician: 10/28/2021      Next office visit with prescribing clinician: Visit date not found            Ursula Lee MA  10/31/22, 11:45 EDT

## 2022-11-07 DIAGNOSIS — I10 ESSENTIAL HYPERTENSION: ICD-10-CM

## 2022-11-07 RX ORDER — LISINOPRIL 20 MG/1
20 TABLET ORAL DAILY
Qty: 90 TABLET | Refills: 3 | OUTPATIENT
Start: 2022-11-07

## 2022-11-07 NOTE — TELEPHONE ENCOUNTER
It is expected that the patient's follow-up every 6 to 12 months for medication refills and annual exam.  It has been over 1 year since you have been seen in our office, please schedule visit for further refills.

## 2022-11-07 NOTE — TELEPHONE ENCOUNTER
Rx Refill Note  Requested Prescriptions     Pending Prescriptions Disp Refills   • lisinopril (PRINIVIL,ZESTRIL) 20 MG tablet 90 tablet 3     Sig: Take 1 tablet by mouth Daily.      Last office visit with prescribing clinician: 10/28/2021      Next office visit with prescribing clinician: Visit date not found            Ursula Lee MA  11/07/22, 10:43 EST

## 2022-11-07 NOTE — TELEPHONE ENCOUNTER
Caller: Saroj Ruggiero    Relationship: Self    Best call back number: [phone]    Requested Prescriptions:   Requested Prescriptions     Pending Prescriptions Disp Refills   • lisinopril (PRINIVIL,ZESTRIL) 20 MG tablet 90 tablet 3     Sig: Take 1 tablet by mouth Daily.        Pharmacy where request should be sent: [facility] #[id number] - [place], [place] - [address] [place] [address] AT [facility] & [facility] [phone] [facility] [phone] FX     Additional details provided by patient: MEDICATION ON 10/31 SENT TO WRONG PHARMACY AND DID NOT GO THROUGH.  PLEASE RESEND.  PATIENT OUT OF MEDICATION.     Does the patient have less than a 3 day supply:  [x] Yes  [] No    Meenakshi Amado   11/07/22 10:29 EST

## 2022-11-07 NOTE — TELEPHONE ENCOUNTER
Caller: Saroj Ruggiero    Relationship: Self    Best call back number: [phone]    Requested Prescriptions:   Requested Prescriptions     Refused Prescriptions Disp Refills   • lisinopril (PRINIVIL,ZESTRIL) 20 MG tablet 90 tablet 3     Sig: Take 1 tablet by mouth Daily.     Refused By: MELVIN ROTHMAN     Reason for Refusal: Patient needs an appointment        Pharm[facility] where request should be sent: [id number] - [place], [place] - [address] [place] [address] AT [facility] & [facility] [phone] [facility] [phone] FX     Additional details provided by patient: OUT OF MEDICATION. PATIENT HAS AN APPOINTMENT WITH PCP ON 11/23/22 AT 9:00 AM.     Does the patient have less than a 3 day supply:  [x] Yes  [] No    Effie Ruiz Rep   11/07/22 12:16 EST

## 2022-11-09 NOTE — TELEPHONE ENCOUNTER
Caller: Saroj Ruggiero    Relationship: Self    Best call back number: [phone]    What was the call regarding: PATIENT HAS BEEN OUT OF MEDICATION FOR A WEEK. ASKING FOR MEDICATION TO HOLD HIM OVER UNTIL HIS APPOINTMENT DUE TO HE IS HAVING HEADACHES NOW. STATES HE CAN NOT COME BEFORE HIS CURRENTLY SCHEDULED APPOINTMENT.    Do you require a callback: YES, PLEASE IF NOT ABLE TO REFILL MEDICATION TO HOLD HIM OVER UNTIL HIS APPOINTMENT

## 2022-11-23 ENCOUNTER — OFFICE VISIT (OUTPATIENT)
Dept: FAMILY MEDICINE CLINIC | Facility: CLINIC | Age: 52
End: 2022-11-23

## 2022-11-23 ENCOUNTER — LAB (OUTPATIENT)
Dept: LAB | Facility: HOSPITAL | Age: 52
End: 2022-11-23

## 2022-11-23 VITALS
SYSTOLIC BLOOD PRESSURE: 120 MMHG | WEIGHT: 266 LBS | TEMPERATURE: 98 F | HEIGHT: 71 IN | DIASTOLIC BLOOD PRESSURE: 100 MMHG | BODY MASS INDEX: 37.24 KG/M2 | HEART RATE: 64 BPM | OXYGEN SATURATION: 98 %

## 2022-11-23 DIAGNOSIS — E78.41 ELEVATED LIPOPROTEIN(A): ICD-10-CM

## 2022-11-23 DIAGNOSIS — I10 ESSENTIAL HYPERTENSION: ICD-10-CM

## 2022-11-23 DIAGNOSIS — Z12.5 PROSTATE CANCER SCREENING: ICD-10-CM

## 2022-11-23 DIAGNOSIS — Z00.00 ANNUAL PHYSICAL EXAM: Primary | ICD-10-CM

## 2022-11-23 DIAGNOSIS — Z00.00 ANNUAL PHYSICAL EXAM: ICD-10-CM

## 2022-11-23 DIAGNOSIS — N52.9 ERECTILE DYSFUNCTION, UNSPECIFIED ERECTILE DYSFUNCTION TYPE: ICD-10-CM

## 2022-11-23 DIAGNOSIS — Z23 IMMUNIZATION DUE: ICD-10-CM

## 2022-11-23 DIAGNOSIS — R73.9 HYPERGLYCEMIA: ICD-10-CM

## 2022-11-23 PROCEDURE — 90686 IIV4 VACC NO PRSV 0.5 ML IM: CPT | Performed by: FAMILY MEDICINE

## 2022-11-23 PROCEDURE — 99396 PREV VISIT EST AGE 40-64: CPT | Performed by: FAMILY MEDICINE

## 2022-11-23 PROCEDURE — 90471 IMMUNIZATION ADMIN: CPT | Performed by: FAMILY MEDICINE

## 2022-11-23 PROCEDURE — 90472 IMMUNIZATION ADMIN EACH ADD: CPT | Performed by: FAMILY MEDICINE

## 2022-11-23 PROCEDURE — 90750 HZV VACC RECOMBINANT IM: CPT | Performed by: FAMILY MEDICINE

## 2022-11-23 RX ORDER — SILDENAFIL 50 MG/1
50 TABLET, FILM COATED ORAL DAILY PRN
Qty: 30 TABLET | Refills: 5 | Status: SHIPPED | OUTPATIENT
Start: 2022-11-23

## 2022-11-23 NOTE — PROGRESS NOTES
Follow Up Office Visit      Patient Name: Saroj Ruggiero  : 1970   MRN: [id number]     Chief Complaint:    Chief Complaint   Patient presents with   • Hypertension   • Annual Exam       History of Present Illness: Saroj Ruggiero is a 52 y.o. male who is here today to follow up with htn and colon polyps. He is going to f/u with GI for another colonoscopy.     htn well controlled. Ran out of med, back on it.  Over the past year a lot of changes have occurred.  Patient started smoking again, got a divorce, and got a new job.  Patient seems to be going through adjustment disorder getting used to all the new changes.  One of his children is still living with him and he splits custody.    Patient is reporting rectal dysfunction.  Says he does not really need Viagra but would like to have it on hand if needed.    Patient says this does help.  Patient is upset that he is started smoking again and understands that he would like to stop.  Patient says his mother has been  try to get him to quit.    Annual exam: Patient presents for his annual exams we reviewed dietary recommendation, exercise recommendations, dental care, vaccines, and colorectal cancer screening.  Also discussed prostate cancer screening.      Review of systems was negative for chest pain, shortness of breath, lower extreme edema      Physical exam: Patient's neurological grossly intact.  HEENT exam atraumatic.  PERRLA.  Patient's neck was supple.  Respirations nonlabored.  Lung exam CTA bilaterally.  Heart exam RRR no murmurs.  No lower extreme edema.  No joint swelling.  Patient mood and affect is appropriate.  Muscle tone appropriate.      Subjective        I have reviewed and the following portions of the patient's history were updated as appropriate: past family history, past medical history, past social history, past surgical history and problem list.    Medications:     Current Outpatient Medications:   •  ascorbic acid (VITAMIN C) 100  "MG tablet, , Disp: , Rfl:   •  Cholecalciferol (Vitamin D3) 50 MCG (2000 UT) capsule, , Disp: , Rfl:   •  esomeprazole (nexIUM) 20 MG capsule, , Disp: , Rfl:   •  fluticasone (Flonase) 50 MCG/ACT nasal spray, Instill 2 sprays into the nostril(s) as directed by provider Daily., Disp: 15.8 mL, Rfl: 5  •  lisinopril (PRINIVIL,ZESTRIL) 20 MG tablet, Take 1 tablet by mouth Daily., Disp: 90 tablet, Rfl: 3  •  Multiple Vitamins-Minerals (MULTIVITAMIN ADULT PO), multivitamin, Disp: , Rfl:   •  Zinc 50 MG tablet, , Disp: , Rfl:   •  sildenafil (Viagra) 50 MG tablet, Take 1 tablet by mouth Daily As Needed for Erectile Dysfunction., Disp: 30 tablet, Rfl: 5    Allergies:   No Known Allergies    Objective     Physical Exam: Please see above  Vital Signs:   Vitals:    11/23/22 0857   BP: 120/100   Pulse: 64   Temp: 98 °F (36.7 °C)   SpO2: 98%   Weight: 121 kg (266 lb)   Height: 180.3 cm (71\")   PainSc: 0-No pain     Body mass index is 37.1 kg/m².          Assessment / Plan      Assessment/Plan:   Diagnoses and all orders for this visit:    1. Annual physical exam (Primary)  -     Hepatitis C Antibody; Future  -     HIV-1 / O / 2 Ag / Antibody 4th Generation; Future    2. Immunization due  -     Shingrix Vaccine  -     Shingrix Vaccine; Future  -     FluLaval/Fluzone >6 mos (0995-1658)    3. Essential hypertension  -     Comprehensive Metabolic Panel; Future  -     Microalbumin / Creatinine Urine Ratio - Urine, Clean Catch; Future    4. Prostate cancer screening  -     PSA Screen; Future    5. Hyperglycemia  -     Hemoglobin A1c; Future    6. Elevated lipoprotein(a)  -     Lipid Panel; Future    7. Erectile dysfunction, unspecified erectile dysfunction type  -     sildenafil (Viagra) 50 MG tablet; Take 1 tablet by mouth Daily As Needed for Erectile Dysfunction.  Dispense: 30 tablet; Refill: 5    Annual counseling: Constipation regards to diet, exercise, vaccines, dental care, prostate cancer screening, colorectal cancer screening. "  Recommend moderate intensity exercise 30 minutes 5 days a week.    Hypertension well controlled.  Will screen for diabetes.  Recheck lipid panel.    Start sildenafil for ED.      6-month checkup was scheduled for patient to check on smoking cessation, weight, adjustment disorder from changes over the year.  Goal is to quit smoking and exercise more by our next visit.  Follow Up:   Return in about 6 months (around 5/23/2023) for Labs today, Recheck.    Eric Milian, DO  [facility] [place]

## 2022-11-26 LAB
ALBUMIN SERPL-MCNC: 4.6 G/DL (ref 3.8–4.9)
ALBUMIN/CREAT UR: 4 MG/G CREAT (ref 0–29)
ALBUMIN/GLOB SERPL: 2.1 {RATIO} (ref 1.2–2.2)
ALP SERPL-CCNC: 150 IU/L (ref 44–121)
ALT SERPL-CCNC: 29 IU/L (ref 0–44)
AST SERPL-CCNC: 24 IU/L (ref 0–40)
BILIRUB SERPL-MCNC: 0.2 MG/DL (ref 0–1.2)
BUN SERPL-MCNC: 17 MG/DL (ref 6–24)
BUN/CREAT SERPL: 19 (ref 9–20)
CALCIUM SERPL-MCNC: 9.3 MG/DL (ref 8.7–10.2)
CHLORIDE SERPL-SCNC: 103 MMOL/L (ref 96–106)
CHOLEST SERPL-MCNC: 163 MG/DL (ref 100–199)
CO2 SERPL-SCNC: 24 MMOL/L (ref 20–29)
CREAT SERPL-MCNC: 0.91 MG/DL (ref 0.76–1.27)
CREAT UR-MCNC: 196.7 MG/DL
EGFRCR SERPLBLD CKD-EPI 2021: 100 ML/MIN/1.73
GLOBULIN SER CALC-MCNC: 2.2 G/DL (ref 1.5–4.5)
GLUCOSE SERPL-MCNC: 106 MG/DL (ref 70–99)
HBA1C MFR BLD: 5.8 % (ref 4.8–5.6)
HCV AB S/CO SERPL IA: <0.1 S/CO RATIO (ref 0–0.9)
HDLC SERPL-MCNC: 23 MG/DL
HIV 1+2 AB+HIV1 P24 AG SERPL QL IA: NON REACTIVE
LDLC SERPL CALC-MCNC: 105 MG/DL (ref 0–99)
MICROALBUMIN UR-MCNC: 7.8 UG/ML
POTASSIUM SERPL-SCNC: 4.3 MMOL/L (ref 3.5–5.2)
PROT SERPL-MCNC: 6.8 G/DL (ref 6–8.5)
PSA SERPL-MCNC: 0.5 NG/ML (ref 0–4)
SODIUM SERPL-SCNC: 141 MMOL/L (ref 134–144)
TRIGL SERPL-MCNC: 199 MG/DL (ref 0–149)
VLDLC SERPL CALC-MCNC: 35 MG/DL (ref 5–40)

## 2023-03-03 ENCOUNTER — OFFICE VISIT (OUTPATIENT)
Dept: FAMILY MEDICINE CLINIC | Facility: CLINIC | Age: 53
End: 2023-03-03
Payer: COMMERCIAL

## 2023-03-03 VITALS
WEIGHT: 248 LBS | SYSTOLIC BLOOD PRESSURE: 124 MMHG | HEIGHT: 71 IN | BODY MASS INDEX: 34.72 KG/M2 | OXYGEN SATURATION: 97 % | TEMPERATURE: 97.8 F | HEART RATE: 62 BPM | DIASTOLIC BLOOD PRESSURE: 84 MMHG

## 2023-03-03 DIAGNOSIS — F41.9 ANXIETY: ICD-10-CM

## 2023-03-03 DIAGNOSIS — Z23 IMMUNIZATION DUE: Primary | ICD-10-CM

## 2023-03-03 DIAGNOSIS — Z87.891 PERSONAL HISTORY OF NICOTINE DEPENDENCE: ICD-10-CM

## 2023-03-03 PROCEDURE — 90471 IMMUNIZATION ADMIN: CPT | Performed by: FAMILY MEDICINE

## 2023-03-03 PROCEDURE — 90750 HZV VACC RECOMBINANT IM: CPT | Performed by: FAMILY MEDICINE

## 2023-03-03 PROCEDURE — 90472 IMMUNIZATION ADMIN EACH ADD: CPT | Performed by: FAMILY MEDICINE

## 2023-03-03 PROCEDURE — 99213 OFFICE O/P EST LOW 20 MIN: CPT | Performed by: FAMILY MEDICINE

## 2023-03-03 PROCEDURE — 90677 PCV20 VACCINE IM: CPT | Performed by: FAMILY MEDICINE

## 2023-03-03 RX ORDER — CHLORAL HYDRATE 500 MG
CAPSULE ORAL
COMMUNITY

## 2023-03-03 RX ORDER — POLYETHYLENE GLYCOL 3350 17 G
2 POWDER IN PACKET (EA) ORAL AS NEEDED
Qty: 108 EACH | Refills: 2 | Status: SHIPPED | OUTPATIENT
Start: 2023-03-03

## 2023-03-03 RX ORDER — PAROXETINE 10 MG/1
10 TABLET, FILM COATED ORAL EVERY MORNING
Qty: 30 TABLET | Refills: 2 | Status: SHIPPED | OUTPATIENT
Start: 2023-03-03

## 2023-05-30 DIAGNOSIS — F41.9 ANXIETY: ICD-10-CM

## 2023-05-30 RX ORDER — PAROXETINE 10 MG/1
10 TABLET, FILM COATED ORAL EVERY MORNING
Qty: 30 TABLET | Refills: 2 | Status: SHIPPED | OUTPATIENT
Start: 2023-05-30 | End: 2023-06-02

## 2023-06-02 ENCOUNTER — OFFICE VISIT (OUTPATIENT)
Dept: FAMILY MEDICINE CLINIC | Facility: CLINIC | Age: 53
End: 2023-06-02

## 2023-06-02 VITALS
HEIGHT: 72 IN | TEMPERATURE: 98.6 F | WEIGHT: 241 LBS | SYSTOLIC BLOOD PRESSURE: 116 MMHG | DIASTOLIC BLOOD PRESSURE: 78 MMHG | BODY MASS INDEX: 32.64 KG/M2 | HEART RATE: 81 BPM

## 2023-06-02 DIAGNOSIS — F41.9 ANXIETY: ICD-10-CM

## 2023-06-02 DIAGNOSIS — Z87.891 PERSONAL HISTORY OF NICOTINE DEPENDENCE: ICD-10-CM

## 2023-06-02 DIAGNOSIS — R73.03 PRE-DIABETES: Primary | ICD-10-CM

## 2023-06-02 LAB
EXPIRATION DATE: NORMAL
HBA1C MFR BLD: 5.9 %
Lab: NORMAL

## 2023-06-02 RX ORDER — PAROXETINE HYDROCHLORIDE 20 MG/1
20 TABLET, FILM COATED ORAL EVERY MORNING
Qty: 30 TABLET | Refills: 2 | Status: SHIPPED | OUTPATIENT
Start: 2023-06-02

## 2023-06-02 NOTE — PROGRESS NOTES
Follow Up Office Visit      Patient Name: Saroj Ruggiero  : 1970   MRN: [id number]     Chief Complaint:    Chief Complaint   Patient presents with   • Anxiety     F/u  Would like to go to a higher dose        History of Present Illness: Saroj Ruggiero is a 52 y.o. male who is here today to follow up with anxiety and he is still smoking.  Patient's anxiety has improved, and his HINA-7 is only 3 today.  Patient says he like to go up a little bit higher on the dose to see how it does.  He is trying to quit smoking and is still smoking about a three quarters of a pack.  Patient does not use nicotine patches but says he is wanting to start him.  He is working out regularly.  He is lost around 25 pounds over the last 7 months.      Physical exam: Patient's mood and affect was appropriate      Subjective        I have reviewed and the following portions of the patient's history were updated as appropriate: past family history, past medical history, past social history, past surgical history and problem list.    Medications:     Current Outpatient Medications:   •  ascorbic acid (VITAMIN C) 100 MG tablet, , Disp: , Rfl:   •  Cholecalciferol (Vitamin D3) 50 MCG (2000) capsule, , Disp: , Rfl:   •  esomeprazole (nexIUM) 20 MG capsule, , Disp: , Rfl:   •  fluticasone (Flonase) 50 MCG/ACT nasal spray, Instill 2 sprays into the nostril(s) as directed by provider Daily., Disp: 15.8 mL, Rfl: 5  •  lisinopril (PRINIVIL,ZESTRIL) 20 MG tablet, Take 1 tablet by mouth Daily., Disp: 90 tablet, Rfl: 3  •  Multiple Vitamins-Minerals (MULTIVITAMIN ADULT PO), multivitamin, Disp: , Rfl:   •  nicotine polacrilex (COMMIT) 2 MG lozenge, Dissolve 1 lozenge in the mouth As Needed for Smoking Cessation. No more than 5 per day, Disp: 108 each, Rfl: 2  •  Omega-3 Fatty Acids (fish oil) 1000 MG capsule capsule, Take  by mouth Daily With Breakfast., Disp: , Rfl:   •  PARoxetine (Paxil) 20 MG tablet, Take 1 tablet by mouth Every  "Morning., Disp: 30 tablet, Rfl: 2  •  sildenafil (Viagra) 50 MG tablet, Take 1 tablet by mouth Daily As Needed for Erectile Dysfunction., Disp: 30 tablet, Rfl: 5  •  Zinc 50 MG tablet, , Disp: , Rfl:     Allergies:   No Known Allergies    Objective     Physical Exam: Please see above  Vital Signs:   Vitals:    06/02/23 0737   BP: 116/78   Pulse: 81   Temp: 98.6 °F (37 °C)   Weight: 109 kg (241 lb)   Height: 182.9 cm (72\")   PainSc: 0-No pain     Body mass index is 32.69 kg/m².          Assessment / Plan      Assessment/Plan:   Diagnoses and all orders for this visit:    1. Pre-diabetes (Primary)  -     POC Glycosylated Hemoglobin (Hb A1C)    2. Anxiety  -     PARoxetine (Paxil) 20 MG tablet; Take 1 tablet by mouth Every Morning.  Dispense: 30 tablet; Refill: 2    3. Personal history of nicotine dependence  -      CT Chest Low Dose Cancer Screening WO; Future    Congratulations on weight loss.  Encouraged him to continue working out and doing more resistance training.    Lung cancer screening ordered today.    Prediabetes stable, check in 6 months    Paxil-increase.  Anxiety controlled, but we will see if there is any added benefit to increasing Paxil at this time.  Patient preferred increasing Paxil so this was a shared decision.        Follow Up:   Return in about 3 months (around 9/2/2023) for Recheck.    Eric Milian DO  [facility] [place] [facility] [place]   Answers for HPI/ROS submitted by the patient on 5/31/2023  What is the primary reason for your visit?: Other  Please describe your symptoms.: Follow up appointment  Have you had these symptoms before?: No  How long have you been having these symptoms?: Greater than 2 weeks      "

## 2023-09-01 ENCOUNTER — OFFICE VISIT (OUTPATIENT)
Dept: FAMILY MEDICINE CLINIC | Facility: CLINIC | Age: 53
End: 2023-09-01
Payer: COMMERCIAL

## 2023-09-01 VITALS
BODY MASS INDEX: 33 KG/M2 | OXYGEN SATURATION: 99 % | HEIGHT: 72 IN | SYSTOLIC BLOOD PRESSURE: 120 MMHG | TEMPERATURE: 97.3 F | WEIGHT: 243.6 LBS | HEART RATE: 70 BPM | DIASTOLIC BLOOD PRESSURE: 76 MMHG

## 2023-09-01 DIAGNOSIS — F41.9 ANXIETY: ICD-10-CM

## 2023-09-01 DIAGNOSIS — J40 BRONCHITIS: Primary | ICD-10-CM

## 2023-09-01 PROCEDURE — 99213 OFFICE O/P EST LOW 20 MIN: CPT | Performed by: FAMILY MEDICINE

## 2023-09-01 RX ORDER — FAMOTIDINE 40 MG/1
40 TABLET, FILM COATED ORAL 2 TIMES DAILY
Status: CANCELLED | OUTPATIENT
Start: 2023-09-01

## 2023-09-01 RX ORDER — METHYLPREDNISOLONE 4 MG/1
TABLET ORAL
Qty: 21 TABLET | Refills: 0 | Status: SHIPPED | OUTPATIENT
Start: 2023-09-01

## 2023-09-01 RX ORDER — HYDROCHLOROTHIAZIDE 12.5 MG/1
CAPSULE, GELATIN COATED ORAL
COMMUNITY

## 2023-09-01 RX ORDER — BENZONATATE 100 MG/1
100 CAPSULE ORAL 3 TIMES DAILY PRN
Qty: 30 CAPSULE | Refills: 0 | Status: SHIPPED | OUTPATIENT
Start: 2023-09-01

## 2023-09-01 RX ORDER — AMOXICILLIN AND CLAVULANATE POTASSIUM 875; 125 MG/1; MG/1
1 TABLET, FILM COATED ORAL 2 TIMES DAILY
Qty: 14 TABLET | Refills: 0 | Status: SHIPPED | OUTPATIENT
Start: 2023-09-01

## 2023-09-01 RX ORDER — FAMOTIDINE 40 MG/1
TABLET, FILM COATED ORAL
COMMUNITY

## 2023-09-01 NOTE — PROGRESS NOTES
Follow Up Office Visit      Patient Name: Saroj Ruggiero  : 1970   MRN: [id number]     Chief Complaint:    Chief Complaint   Patient presents with    Cough     Has had cough for a week. Gets HA when he has a coughing spell. Has nasal and chest congestion. Last week snot was green but now clear.       History of Present Illness: Saroj Ruggiero is a 53 y.o. male who is here today to follow up with anxiety which has improved a lot on Paxil.  He is doing very well on the current dose.  Patient is here as well with acute illness.  Over the past week has had a headache, cough.  Now he has nasal congestion and clear sputum.  Patient reports that he just feels sick overall.  No fevers.  No sick contacts.      Physical exam: Oropharynx erythema.  No lymphadenopathy in cervical region.  Lungs CTA bilateral.  Cough on exam      Subjective        I have reviewed and the following portions of the patient's history were updated as appropriate: past family history, past medical history, past social history, past surgical history and problem list.    Medications:     Current Outpatient Medications:     ascorbic acid (VITAMIN C) 100 MG tablet, , Disp: , Rfl:     Cholecalciferol (Vitamin D3) 50 MCG (2000) capsule, , Disp: , Rfl:     fluticasone (Flonase) 50 MCG/ACT nasal spray, Instill 2 sprays into the nostril(s) as directed by provider Daily., Disp: 15.8 mL, Rfl: 5    hydroCHLOROthiazide (MICROZIDE) 12.5 MG capsule, TAKE 1 CAPSULE(S) EVERY DAY BY ORAL ROUTE., Disp: , Rfl:     lisinopril (PRINIVIL,ZESTRIL) 20 MG tablet, Take 1 tablet by mouth Daily., Disp: 90 tablet, Rfl: 3    Multiple Vitamins-Minerals (MULTIVITAMIN ADULT PO), multivitamin, Disp: , Rfl:     nicotine polacrilex (COMMIT) 2 MG lozenge, Dissolve 1 lozenge in the mouth As Needed for Smoking Cessation. No more than 5 per day, Disp: 108 each, Rfl: 2    Omega-3 Fatty Acids (fish oil) 1000 MG capsule capsule, Take  by mouth Daily With Breakfast., Disp: ,  "Rfl:     PARoxetine (Paxil) 20 MG tablet, Take 1 tablet by mouth Every Morning., Disp: 90 tablet, Rfl: 3    sildenafil (Viagra) 50 MG tablet, Take 1 tablet by mouth Daily As Needed for Erectile Dysfunction., Disp: 30 tablet, Rfl: 5    Zinc 50 MG tablet, , Disp: , Rfl:     amoxicillin-clavulanate (AUGMENTIN) 875-125 MG per tablet, Take 1 tablet by mouth 2 (Two) Times a Day., Disp: 14 tablet, Rfl: 0    benzonatate (Tessalon Perles) 100 MG capsule, Take 1 capsule by mouth 3 (Three) Times a Day As Needed for Cough., Disp: 30 capsule, Rfl: 0    famotidine (PEPCID) 40 MG tablet, TAKE 1 TABLET(S) EVERY DAY BY ORAL ROUTE AS NEEDED.PT NEEDS APPOINTMENT BEFORE MEDICATION RUNS OUT. (Patient not taking: Reported on 9/1/2023), Disp: , Rfl:     methylPREDNISolone (MEDROL) 4 MG dose pack, Take as directed on package instructions., Disp: 21 tablet, Rfl: 0    Allergies:   No Known Allergies    Objective     Physical Exam: Please see above  Vital Signs:   Vitals:    09/01/23 0858   BP: 120/76   Pulse: 70   Temp: 97.3 øF (36.3 øC)   SpO2: 99%   Weight: 110 kg (243 lb 9.6 oz)   Height: 182.9 cm (72\")     Body mass index is 33.04 kg/mý.          Assessment / Plan      Assessment/Plan:   Diagnoses and all orders for this visit:    1. Bronchitis (Primary)  -     methylPREDNISolone (MEDROL) 4 MG dose pack; Take as directed on package instructions.  Dispense: 21 tablet; Refill: 0  -     amoxicillin-clavulanate (AUGMENTIN) 875-125 MG per tablet; Take 1 tablet by mouth 2 (Two) Times a Day.  Dispense: 14 tablet; Refill: 0  -     benzonatate (Tessalon Perles) 100 MG capsule; Take 1 capsule by mouth 3 (Three) Times a Day As Needed for Cough.  Dispense: 30 capsule; Refill: 0    2. Anxiety    Patient presents with bronchitis.  Given the length of symptoms and they have been worsening, we will treat as above.  Consider doxycycline in the future if Augmentin does not clear up his symptoms.    Continue current dose of Paxil and call for refills as " needed.  Follow-up for yearly exam    Follow Up:   No follow-ups on file.    Eric Milian DO  [facility] [place]

## 2023-10-30 DIAGNOSIS — I10 ESSENTIAL HYPERTENSION: ICD-10-CM

## 2023-10-30 RX ORDER — LISINOPRIL 20 MG/1
20 TABLET ORAL DAILY
Qty: 90 TABLET | Refills: 3 | Status: SHIPPED | OUTPATIENT
Start: 2023-10-30

## 2024-02-20 ENCOUNTER — PATIENT MESSAGE (OUTPATIENT)
Dept: FAMILY MEDICINE CLINIC | Facility: CLINIC | Age: 54
End: 2024-02-20
Payer: COMMERCIAL

## 2024-04-04 ENCOUNTER — TELEPHONE (OUTPATIENT)
Dept: FAMILY MEDICINE CLINIC | Facility: CLINIC | Age: 54
End: 2024-04-04
Payer: COMMERCIAL

## 2024-04-04 NOTE — TELEPHONE ENCOUNTER
LVM reminder of pending CT Chest Low Dose Cancer Screening that Dr. Milian ordered on 07/14/23 left the scheduling phone number .

## 2024-07-23 DIAGNOSIS — F41.9 ANXIETY: ICD-10-CM

## 2024-07-23 RX ORDER — PAROXETINE HYDROCHLORIDE 20 MG/1
20 TABLET, FILM COATED ORAL EVERY MORNING
Qty: 90 TABLET | Refills: 0 | Status: SHIPPED | OUTPATIENT
Start: 2024-07-23

## 2024-07-23 NOTE — TELEPHONE ENCOUNTER
Rx Refill Note  Requested Prescriptions     Pending Prescriptions Disp Refills    PARoxetine (Paxil) 20 MG tablet 90 tablet 3     Sig: Take 1 tablet by mouth Every Morning.      Last office visit with prescribing clinician: 9/1/2023   Last telemedicine visit with prescribing clinician: Visit date not found   Next office visit with prescribing clinician: Visit date not found                         Would you like a call back once the refill request has been completed: [] Yes [] No    If the office needs to give you a call back, can they leave a voicemail: [] Yes [] No    Aleisha Urias MA  07/23/24, 11:27 EDT

## 2024-10-28 DIAGNOSIS — I10 ESSENTIAL HYPERTENSION: ICD-10-CM

## 2024-10-28 DIAGNOSIS — F41.9 ANXIETY: ICD-10-CM

## 2024-10-29 RX ORDER — LISINOPRIL 20 MG/1
20 TABLET ORAL DAILY
Qty: 90 TABLET | Refills: 3 | OUTPATIENT
Start: 2024-10-29

## 2024-10-29 RX ORDER — PAROXETINE 20 MG/1
20 TABLET, FILM COATED ORAL EVERY MORNING
Qty: 90 TABLET | Refills: 0 | OUTPATIENT
Start: 2024-10-29

## 2024-12-02 DIAGNOSIS — F41.9 ANXIETY: ICD-10-CM

## 2024-12-02 DIAGNOSIS — I10 ESSENTIAL HYPERTENSION: ICD-10-CM

## 2024-12-03 RX ORDER — PAROXETINE 20 MG/1
20 TABLET, FILM COATED ORAL EVERY MORNING
Qty: 90 TABLET | Refills: 0 | OUTPATIENT
Start: 2024-12-03

## 2024-12-03 RX ORDER — LISINOPRIL 20 MG/1
20 TABLET ORAL DAILY
Qty: 90 TABLET | Refills: 3 | OUTPATIENT
Start: 2024-12-03

## 2024-12-04 ENCOUNTER — OFFICE VISIT (OUTPATIENT)
Dept: FAMILY MEDICINE CLINIC | Facility: CLINIC | Age: 54
End: 2024-12-04
Payer: COMMERCIAL

## 2024-12-04 ENCOUNTER — LAB (OUTPATIENT)
Dept: LAB | Facility: HOSPITAL | Age: 54
End: 2024-12-04
Payer: COMMERCIAL

## 2024-12-04 VITALS
WEIGHT: 254 LBS | BODY MASS INDEX: 34.4 KG/M2 | RESPIRATION RATE: 17 BRPM | HEART RATE: 64 BPM | DIASTOLIC BLOOD PRESSURE: 88 MMHG | HEIGHT: 72 IN | OXYGEN SATURATION: 96 % | SYSTOLIC BLOOD PRESSURE: 134 MMHG

## 2024-12-04 DIAGNOSIS — R68.82 LOW LIBIDO: ICD-10-CM

## 2024-12-04 DIAGNOSIS — E78.41 ELEVATED LIPOPROTEIN(A): ICD-10-CM

## 2024-12-04 DIAGNOSIS — Z87.891 PERSONAL HISTORY OF NICOTINE DEPENDENCE: ICD-10-CM

## 2024-12-04 DIAGNOSIS — F41.9 ANXIETY: ICD-10-CM

## 2024-12-04 DIAGNOSIS — I10 ESSENTIAL HYPERTENSION: ICD-10-CM

## 2024-12-04 DIAGNOSIS — Z00.00 ANNUAL PHYSICAL EXAM: Primary | ICD-10-CM

## 2024-12-04 DIAGNOSIS — Z12.5 PROSTATE CANCER SCREENING: ICD-10-CM

## 2024-12-04 DIAGNOSIS — G47.33 OBSTRUCTIVE SLEEP APNEA SYNDROME: ICD-10-CM

## 2024-12-04 DIAGNOSIS — Z23 IMMUNIZATION DUE: ICD-10-CM

## 2024-12-04 DIAGNOSIS — K21.9 GASTROESOPHAGEAL REFLUX DISEASE WITHOUT ESOPHAGITIS: ICD-10-CM

## 2024-12-04 LAB
BASOPHILS # BLD AUTO: 0.03 10*3/MM3 (ref 0–0.2)
BASOPHILS NFR BLD AUTO: 0.3 % (ref 0–1.5)
DEPRECATED RDW RBC AUTO: 39.5 FL (ref 37–54)
EOSINOPHIL # BLD AUTO: 0.2 10*3/MM3 (ref 0–0.4)
EOSINOPHIL NFR BLD AUTO: 2.2 % (ref 0.3–6.2)
ERYTHROCYTE [DISTWIDTH] IN BLOOD BY AUTOMATED COUNT: 12.8 % (ref 12.3–15.4)
HCT VFR BLD AUTO: 51.4 % (ref 37.5–51)
HGB BLD-MCNC: 17.6 G/DL (ref 13–17.7)
IMM GRANULOCYTES # BLD AUTO: 0.02 10*3/MM3 (ref 0–0.05)
IMM GRANULOCYTES NFR BLD AUTO: 0.2 % (ref 0–0.5)
LYMPHOCYTES # BLD AUTO: 2.07 10*3/MM3 (ref 0.7–3.1)
LYMPHOCYTES NFR BLD AUTO: 22.8 % (ref 19.6–45.3)
MCH RBC QN AUTO: 29.5 PG (ref 26.6–33)
MCHC RBC AUTO-ENTMCNC: 34.2 G/DL (ref 31.5–35.7)
MCV RBC AUTO: 86.1 FL (ref 79–97)
MONOCYTES # BLD AUTO: 0.55 10*3/MM3 (ref 0.1–0.9)
MONOCYTES NFR BLD AUTO: 6.1 % (ref 5–12)
NEUTROPHILS NFR BLD AUTO: 6.19 10*3/MM3 (ref 1.7–7)
NEUTROPHILS NFR BLD AUTO: 68.4 % (ref 42.7–76)
NRBC BLD AUTO-RTO: 0 /100 WBC (ref 0–0.2)
PLATELET # BLD AUTO: 177 10*3/MM3 (ref 140–450)
PMV BLD AUTO: 10.8 FL (ref 6–12)
RBC # BLD AUTO: 5.97 10*6/MM3 (ref 4.14–5.8)
WBC NRBC COR # BLD AUTO: 9.06 10*3/MM3 (ref 3.4–10.8)

## 2024-12-04 PROCEDURE — 80061 LIPID PANEL: CPT

## 2024-12-04 PROCEDURE — 84402 ASSAY OF FREE TESTOSTERONE: CPT

## 2024-12-04 PROCEDURE — 82043 UR ALBUMIN QUANTITATIVE: CPT

## 2024-12-04 PROCEDURE — G0103 PSA SCREENING: HCPCS

## 2024-12-04 PROCEDURE — 90656 IIV3 VACC NO PRSV 0.5 ML IM: CPT | Performed by: FAMILY MEDICINE

## 2024-12-04 PROCEDURE — 84403 ASSAY OF TOTAL TESTOSTERONE: CPT

## 2024-12-04 PROCEDURE — 90471 IMMUNIZATION ADMIN: CPT | Performed by: FAMILY MEDICINE

## 2024-12-04 PROCEDURE — 99396 PREV VISIT EST AGE 40-64: CPT | Performed by: FAMILY MEDICINE

## 2024-12-04 PROCEDURE — 80050 GENERAL HEALTH PANEL: CPT

## 2024-12-04 RX ORDER — BUPROPION HYDROCHLORIDE 150 MG/1
150 TABLET ORAL DAILY
Qty: 30 TABLET | Refills: 3 | Status: SHIPPED | OUTPATIENT
Start: 2024-12-04

## 2024-12-04 RX ORDER — SILDENAFIL 100 MG/1
100 TABLET, FILM COATED ORAL DAILY PRN
Qty: 30 TABLET | Refills: 6 | Status: SHIPPED | OUTPATIENT
Start: 2024-12-04

## 2024-12-04 RX ORDER — PAROXETINE 20 MG/1
20 TABLET, FILM COATED ORAL EVERY MORNING
Qty: 90 TABLET | Refills: 3 | Status: SHIPPED | OUTPATIENT
Start: 2024-12-04

## 2024-12-04 RX ORDER — LISINOPRIL 20 MG/1
20 TABLET ORAL DAILY
Qty: 90 TABLET | Refills: 3 | Status: SHIPPED | OUTPATIENT
Start: 2024-12-04

## 2024-12-04 RX ORDER — FAMOTIDINE 40 MG/1
40 TABLET, FILM COATED ORAL NIGHTLY PRN
Qty: 90 TABLET | Refills: 3 | Status: SHIPPED | OUTPATIENT
Start: 2024-12-04

## 2024-12-04 NOTE — PROGRESS NOTES
Follow Up Office Visit      Patient Name: Saroj Ruggiero  : 1970   MRN: [id number]     Chief Complaint:    Chief Complaint   Patient presents with    Hypertension    Anxiety    Annual Exam       History of Present Illness: Saroj Ruggiero is a 54 y.o. male who is here today to follow up with anxiety, weight gain, hypertension, sleep apnea, GERD and low libido.  She has hyperlipidemia which we discussed for his annual exam today.  Reviewed ASCVD risk of 20% with patient.    For patient's annual exam we reviewed recommendations for diet and exercise.  Reviewed dental care recommendations and vaccines.  Patient was okay with getting flu shot.  Patient up-to-date with colon cancer screening.  Encouraged the patient to exercise regularly and eat a healthy diet with controlled portion sizes to lose weight.    He reports chronic low libido.  Viagra does not help.  Has sleep apnea and not being treated for it currently.    Has hyperlipidemia and ASCVD risk of 20%.  Discussed starting medication after rechecking labs    Doing well on Paxil.  Anxiety is controlled    Had a lot of weight gain and would like to stop smoking cigarettes.  Also has erectile dysfunction.  Discussed starting Wellbutrin to improve libido, weight gain and side effects of Paxil.    Physical exam: Patient's mood and affect is appropriate.  Heart exam RRR.  Lung exam CTA bilateral.      Subjective        I have reviewed and the following portions of the patient's history were updated as appropriate: past family history, past medical history, past social history, past surgical history and problem list.    Medications:     Current Outpatient Medications:     ascorbic acid (VITAMIN C) 100 MG tablet, , Disp: , Rfl:     Cholecalciferol (Vitamin D3) 50 MCG (2000) capsule, , Disp: , Rfl:     famotidine (PEPCID) 40 MG tablet, Take 1 tablet by mouth At Night As Needed for Heartburn., Disp: 90 tablet, Rfl: 3    lisinopril (PRINIVIL,ZESTRIL) 20 MG  "tablet, Take 1 tablet by mouth Daily., Disp: 90 tablet, Rfl: 3    Multiple Vitamins-Minerals (MULTIVITAMIN ADULT PO), multivitamin, Disp: , Rfl:     Omega-3 Fatty Acids (fish oil) 1000 MG capsule capsule, Take  by mouth Daily With Breakfast., Disp: , Rfl:     PARoxetine (Paxil) 20 MG tablet, Take 1 tablet by mouth Every Morning. Follow-up for further refills, Disp: 90 tablet, Rfl: 3    Zinc 50 MG tablet, , Disp: , Rfl:     buPROPion XL (Wellbutrin XL) 150 MG 24 hr tablet, Take 1 tablet by mouth Daily., Disp: 30 tablet, Rfl: 3    fluticasone (Flonase) 50 MCG/ACT nasal spray, Instill 2 sprays into the nostril(s) as directed by provider Daily. (Patient not taking: Reported on 12/4/2024), Disp: 15.8 mL, Rfl: 5    sildenafil (Viagra) 100 MG tablet, Take 1 tablet by mouth Daily As Needed for Erectile Dysfunction., Disp: 30 tablet, Rfl: 6    Allergies:   No Known Allergies    Objective     Physical Exam: Please see above  Vital Signs:   Vitals:    12/04/24 1027   BP: 134/88   BP Location: Left arm   Patient Position: Sitting   Cuff Size: Adult   Pulse: 64   Resp: 17   SpO2: 96%   Weight: 115 kg (254 lb)   Height: 183 cm (72.05\")   PainSc: 0-No pain     Body mass index is 34.4 kg/m².          Assessment / Plan      Assessment/Plan:   Diagnoses and all orders for this visit:    1. Annual physical exam (Primary)    2. Anxiety  -     PARoxetine (Paxil) 20 MG tablet; Take 1 tablet by mouth Every Morning. Follow-up for further refills  Dispense: 90 tablet; Refill: 3    3. Essential hypertension  -     lisinopril (PRINIVIL,ZESTRIL) 20 MG tablet; Take 1 tablet by mouth Daily.  Dispense: 90 tablet; Refill: 3  -     CBC & Differential; Future  -     Comprehensive Metabolic Panel; Future  -     TSH Rfx On Abnormal To Free T4; Future  -     MicroAlbumin, Urine, Random - Urine, Clean Catch; Future    4. Elevated lipoprotein(a)  -     Lipid Panel; Future    5. Prostate cancer screening  -     PSA Screen; Future    6. Gastroesophageal " reflux disease without esophagitis  -     famotidine (PEPCID) 40 MG tablet; Take 1 tablet by mouth At Night As Needed for Heartburn.  Dispense: 90 tablet; Refill: 3    7. Immunization due  -     Fluzone >6mos (0773-0534)    8. Low libido  -     Testosterone, Free, Total; Future  -     sildenafil (Viagra) 100 MG tablet; Take 1 tablet by mouth Daily As Needed for Erectile Dysfunction.  Dispense: 30 tablet; Refill: 6  -     buPROPion XL (Wellbutrin XL) 150 MG 24 hr tablet; Take 1 tablet by mouth Daily.  Dispense: 30 tablet; Refill: 3    9. Personal history of nicotine dependence  -     buPROPion XL (Wellbutrin XL) 150 MG 24 hr tablet; Take 1 tablet by mouth Daily.  Dispense: 30 tablet; Refill: 3    10. Obstructive sleep apnea syndrome  -     Ambulatory Referral to Sleep Medicine    Referred patient to sleep medicine for further management of sleep apnea.  Likely related to his low testosterone and low libido.  Will test testosterone today and may recommend treatment depending on levels.  Start Wellbutrin for low libido, nicotine dependence, and weight loss.    Low libido-increase Viagra.  Start Wellbutrin.  Check testosterone.    GERD stable continue Pepcid.  Anxiety and hypertension stable-continue current therapy.  Repeat labs as above.      Annual exam counseling: Constipation regards to prostate cancer screening, colon cancer screening, vaccines, ASCVD risk.  Encouraged him to exercise regularly and eat a healthy diet to lose weight.  Encouraged him to start statin medication after checking labs again.  Encourage patient stay up-to-date with dental care.  Patient up-to-date with colon cancer screening we will order PSA today.        Follow-up in 6 months for weight check, check on libido, and we can discuss testosterone sooner than that if needed.    Follow Up:   Return in about 1 year (around 12/4/2025) for Labs today, Annual.    Eric Milian DO  [facility] [place] [facility] [place]

## 2024-12-05 DIAGNOSIS — Z12.11 COLON CANCER SCREENING: Primary | ICD-10-CM

## 2024-12-05 DIAGNOSIS — Z87.891 PERSONAL HISTORY OF NICOTINE DEPENDENCE: ICD-10-CM

## 2024-12-05 LAB
ALBUMIN SERPL-MCNC: 4.8 G/DL (ref 3.5–5.2)
ALBUMIN UR-MCNC: <1.2 MG/DL
ALBUMIN/GLOB SERPL: 1.7 G/DL
ALP SERPL-CCNC: 150 U/L (ref 39–117)
ALT SERPL W P-5'-P-CCNC: 39 U/L (ref 1–41)
ANION GAP SERPL CALCULATED.3IONS-SCNC: 13 MMOL/L (ref 5–15)
AST SERPL-CCNC: 31 U/L (ref 1–40)
BILIRUB SERPL-MCNC: 0.4 MG/DL (ref 0–1.2)
BUN SERPL-MCNC: 14 MG/DL (ref 6–20)
BUN/CREAT SERPL: 13.7 (ref 7–25)
CALCIUM SPEC-SCNC: 9.9 MG/DL (ref 8.6–10.5)
CHLORIDE SERPL-SCNC: 97 MMOL/L (ref 98–107)
CHOLEST SERPL-MCNC: 204 MG/DL (ref 0–200)
CO2 SERPL-SCNC: 27 MMOL/L (ref 22–29)
CREAT SERPL-MCNC: 1.02 MG/DL (ref 0.76–1.27)
EGFRCR SERPLBLD CKD-EPI 2021: 86.3 ML/MIN/1.73
GLOBULIN UR ELPH-MCNC: 2.8 GM/DL
GLUCOSE SERPL-MCNC: 95 MG/DL (ref 65–99)
HDLC SERPL-MCNC: 27 MG/DL (ref 40–60)
LDLC SERPL CALC-MCNC: 133 MG/DL (ref 0–100)
LDLC/HDLC SERPL: 4.76 {RATIO}
POTASSIUM SERPL-SCNC: 4.1 MMOL/L (ref 3.5–5.2)
PROT SERPL-MCNC: 7.6 G/DL (ref 6–8.5)
PSA SERPL-MCNC: 0.83 NG/ML (ref 0–4)
SODIUM SERPL-SCNC: 137 MMOL/L (ref 136–145)
TRIGL SERPL-MCNC: 243 MG/DL (ref 0–150)
TSH SERPL DL<=0.05 MIU/L-ACNC: 1.74 UIU/ML (ref 0.27–4.2)
VLDLC SERPL-MCNC: 44 MG/DL (ref 5–40)

## 2024-12-06 LAB
TESTOST FREE SERPL-MCNC: 12.2 PG/ML (ref 7.2–24)
TESTOST SERPL-MCNC: 543 NG/DL (ref 264–916)

## 2025-02-14 DIAGNOSIS — I10 ESSENTIAL HYPERTENSION: ICD-10-CM

## 2025-02-14 DIAGNOSIS — Z87.891 PERSONAL HISTORY OF NICOTINE DEPENDENCE: ICD-10-CM

## 2025-02-14 DIAGNOSIS — R68.82 LOW LIBIDO: ICD-10-CM

## 2025-02-14 DIAGNOSIS — F41.9 ANXIETY: ICD-10-CM

## 2025-02-14 RX ORDER — BUPROPION HYDROCHLORIDE 150 MG/1
150 TABLET ORAL DAILY
Qty: 30 TABLET | Refills: 3 | Status: SHIPPED | OUTPATIENT
Start: 2025-02-14

## 2025-02-14 RX ORDER — PAROXETINE 20 MG/1
20 TABLET, FILM COATED ORAL EVERY MORNING
Qty: 90 TABLET | Refills: 3 | Status: SHIPPED | OUTPATIENT
Start: 2025-02-14

## 2025-02-14 RX ORDER — SILDENAFIL 100 MG/1
100 TABLET, FILM COATED ORAL DAILY PRN
Qty: 30 TABLET | Refills: 6 | Status: SHIPPED | OUTPATIENT
Start: 2025-02-14

## 2025-02-14 RX ORDER — LISINOPRIL 20 MG/1
20 TABLET ORAL DAILY
Qty: 90 TABLET | Refills: 3 | Status: SHIPPED | OUTPATIENT
Start: 2025-02-14

## 2025-02-14 NOTE — TELEPHONE ENCOUNTER
Patient is asking for these prescriptions to be sent to this pharmacy in [place]:    [facility]  [address] [place] [address]

## 2025-02-19 ENCOUNTER — TELEPHONE (OUTPATIENT)
Dept: FAMILY MEDICINE CLINIC | Facility: CLINIC | Age: 55
End: 2025-02-19
Payer: COMMERCIAL

## 2025-02-19 NOTE — TELEPHONE ENCOUNTER
LVM reminder of pending CT Chest that Dr. Milian ordered on 12/05/24, with the scheduling phone number.

## 2025-03-03 DIAGNOSIS — F41.9 ANXIETY: ICD-10-CM

## 2025-03-03 RX ORDER — PAROXETINE 20 MG/1
20 TABLET, FILM COATED ORAL EVERY MORNING
Qty: 90 TABLET | Refills: 3 | Status: SHIPPED | OUTPATIENT
Start: 2025-03-03

## 2025-05-14 DIAGNOSIS — R68.82 LOW LIBIDO: ICD-10-CM

## 2025-05-14 DIAGNOSIS — Z87.891 PERSONAL HISTORY OF NICOTINE DEPENDENCE: ICD-10-CM

## 2025-05-14 RX ORDER — BUPROPION HYDROCHLORIDE 150 MG/1
150 TABLET ORAL DAILY
Qty: 30 TABLET | Refills: 3 | Status: SHIPPED | OUTPATIENT
Start: 2025-05-14

## 2025-06-13 DIAGNOSIS — R68.82 LOW LIBIDO: ICD-10-CM

## 2025-06-13 DIAGNOSIS — Z87.891 PERSONAL HISTORY OF NICOTINE DEPENDENCE: ICD-10-CM

## 2025-06-13 RX ORDER — BUPROPION HYDROCHLORIDE 150 MG/1
150 TABLET ORAL DAILY
Qty: 90 TABLET | Refills: 0 | Status: SHIPPED | OUTPATIENT
Start: 2025-06-13

## 2025-06-13 NOTE — TELEPHONE ENCOUNTER
Caller: [facility] - [place] [address] - [phone] [facility] [phone] FX    Relationship: Pharmacy    Best call back number:  [phone] (Work)     Requested Prescriptions:   Requested Prescriptions     Pending Prescriptions Disp Refills    buPROPion XL (Wellbutrin XL) 150 MG 24 hr tablet 30 tablet 3     Sig: Take 1 tablet by mouth Daily.        Pharmacy where request should be sent: [facility] - [place] [address] - [phone] [facility] [phone] FX     Last office visit with prescribing clinician: 12/4/2024   Last telemedicine visit with prescribing clinician: Visit date not found   Next office visit with prescribing clinician: 6/24/2025       Would you like a call back once the refill request has been completed: [] Yes [x] No    If the office needs to give you a call back, can they leave a voicemail: [] Yes [x] No

## 2025-07-02 DIAGNOSIS — Z87.891 PERSONAL HISTORY OF NICOTINE DEPENDENCE: ICD-10-CM

## 2025-07-02 DIAGNOSIS — R68.82 LOW LIBIDO: ICD-10-CM

## 2025-07-02 RX ORDER — BUPROPION HYDROCHLORIDE 150 MG/1
150 TABLET ORAL DAILY
Qty: 90 TABLET | Refills: 0 | Status: SHIPPED | OUTPATIENT
Start: 2025-07-02